# Patient Record
Sex: MALE | Race: BLACK OR AFRICAN AMERICAN | NOT HISPANIC OR LATINO | Employment: PART TIME | URBAN - METROPOLITAN AREA
[De-identification: names, ages, dates, MRNs, and addresses within clinical notes are randomized per-mention and may not be internally consistent; named-entity substitution may affect disease eponyms.]

---

## 2018-06-04 ENCOUNTER — HOSPITAL ENCOUNTER (EMERGENCY)
Facility: HOSPITAL | Age: 53
Discharge: HOME | End: 2018-06-04
Attending: EMERGENCY MEDICINE
Payer: COMMERCIAL

## 2018-06-04 VITALS
DIASTOLIC BLOOD PRESSURE: 89 MMHG | HEART RATE: 52 BPM | OXYGEN SATURATION: 99 % | WEIGHT: 215 LBS | HEIGHT: 67 IN | SYSTOLIC BLOOD PRESSURE: 138 MMHG | BODY MASS INDEX: 33.74 KG/M2 | TEMPERATURE: 98 F | RESPIRATION RATE: 18 BRPM

## 2018-06-04 DIAGNOSIS — R07.89 MUSCULOSKELETAL CHEST PAIN: ICD-10-CM

## 2018-06-04 DIAGNOSIS — M25.512 ACUTE PAIN OF LEFT SHOULDER: Primary | ICD-10-CM

## 2018-06-04 DIAGNOSIS — R07.9 CHEST PAIN: ICD-10-CM

## 2018-06-04 LAB
ALBUMIN SERPL BCP-MCNC: 4.1 G/DL
ALP SERPL-CCNC: 57 U/L
ALT SERPL W/O P-5'-P-CCNC: 24 U/L
ANION GAP SERPL CALC-SCNC: 6 MMOL/L
AST SERPL-CCNC: 19 U/L
BASOPHILS # BLD AUTO: 0.01 K/UL
BASOPHILS NFR BLD: 0.2 %
BILIRUB SERPL-MCNC: 0.4 MG/DL
BNP SERPL-MCNC: 32 PG/ML
BUN SERPL-MCNC: 19 MG/DL
CALCIUM SERPL-MCNC: 10 MG/DL
CHLORIDE SERPL-SCNC: 107 MMOL/L
CO2 SERPL-SCNC: 28 MMOL/L
CREAT SERPL-MCNC: 1 MG/DL
DIFFERENTIAL METHOD: NORMAL
EOSINOPHIL # BLD AUTO: 0.1 K/UL
EOSINOPHIL NFR BLD: 1.7 %
ERYTHROCYTE [DISTWIDTH] IN BLOOD BY AUTOMATED COUNT: 13.5 %
EST. GFR  (AFRICAN AMERICAN): >60 ML/MIN/1.73 M^2
EST. GFR  (NON AFRICAN AMERICAN): >60 ML/MIN/1.73 M^2
GLUCOSE SERPL-MCNC: 89 MG/DL
HCT VFR BLD AUTO: 43.9 %
HGB BLD-MCNC: 15.2 G/DL
LYMPHOCYTES # BLD AUTO: 1.9 K/UL
LYMPHOCYTES NFR BLD: 29.5 %
MCH RBC QN AUTO: 30.5 PG
MCHC RBC AUTO-ENTMCNC: 34.6 G/DL
MCV RBC AUTO: 88 FL
MONOCYTES # BLD AUTO: 0.6 K/UL
MONOCYTES NFR BLD: 9.6 %
NEUTROPHILS # BLD AUTO: 3.8 K/UL
NEUTROPHILS NFR BLD: 59 %
PLATELET # BLD AUTO: 182 K/UL
PMV BLD AUTO: 12 FL
POTASSIUM SERPL-SCNC: 4.3 MMOL/L
PROT SERPL-MCNC: 7.7 G/DL
RBC # BLD AUTO: 4.99 M/UL
SODIUM SERPL-SCNC: 141 MMOL/L
TROPONIN I SERPL DL<=0.01 NG/ML-MCNC: 0.01 NG/ML
WBC # BLD AUTO: 6.38 K/UL

## 2018-06-04 PROCEDURE — 93005 ELECTROCARDIOGRAM TRACING: CPT

## 2018-06-04 PROCEDURE — 63600175 PHARM REV CODE 636 W HCPCS: Performed by: EMERGENCY MEDICINE

## 2018-06-04 PROCEDURE — 83880 ASSAY OF NATRIURETIC PEPTIDE: CPT

## 2018-06-04 PROCEDURE — 25000003 PHARM REV CODE 250: Performed by: EMERGENCY MEDICINE

## 2018-06-04 PROCEDURE — 99284 EMERGENCY DEPT VISIT MOD MDM: CPT | Mod: 25

## 2018-06-04 PROCEDURE — 93010 ELECTROCARDIOGRAM REPORT: CPT | Mod: ,,, | Performed by: INTERNAL MEDICINE

## 2018-06-04 PROCEDURE — 84484 ASSAY OF TROPONIN QUANT: CPT

## 2018-06-04 PROCEDURE — 96374 THER/PROPH/DIAG INJ IV PUSH: CPT

## 2018-06-04 PROCEDURE — 85025 COMPLETE CBC W/AUTO DIFF WBC: CPT

## 2018-06-04 PROCEDURE — 80053 COMPREHEN METABOLIC PANEL: CPT

## 2018-06-04 RX ORDER — DIAZEPAM 5 MG/1
5 TABLET ORAL EVERY 12 HOURS PRN
Qty: 15 TABLET | Refills: 0 | Status: SHIPPED | OUTPATIENT
Start: 2018-06-04 | End: 2018-07-04

## 2018-06-04 RX ORDER — ALBUTEROL SULFATE 90 UG/1
1-2 AEROSOL, METERED RESPIRATORY (INHALATION) EVERY 6 HOURS PRN
Qty: 1 INHALER | Refills: 0 | Status: SHIPPED | OUTPATIENT
Start: 2018-06-04 | End: 2019-06-04

## 2018-06-04 RX ORDER — ASPIRIN 325 MG
325 TABLET ORAL
Status: DISCONTINUED | OUTPATIENT
Start: 2018-06-04 | End: 2018-06-04

## 2018-06-04 RX ORDER — IBUPROFEN 600 MG/1
600 TABLET ORAL EVERY 8 HOURS PRN
Qty: 15 TABLET | Refills: 0 | Status: SHIPPED | OUTPATIENT
Start: 2018-06-04

## 2018-06-04 RX ORDER — DIAZEPAM 2 MG/1
2 TABLET ORAL
Status: COMPLETED | OUTPATIENT
Start: 2018-06-04 | End: 2018-06-04

## 2018-06-04 RX ORDER — KETOROLAC TROMETHAMINE 30 MG/ML
15 INJECTION, SOLUTION INTRAMUSCULAR; INTRAVENOUS
Status: COMPLETED | OUTPATIENT
Start: 2018-06-04 | End: 2018-06-04

## 2018-06-04 RX ADMIN — KETOROLAC TROMETHAMINE 15 MG: 30 INJECTION, SOLUTION INTRAMUSCULAR at 10:06

## 2018-06-04 RX ADMIN — DIAZEPAM 2 MG: 2 TABLET ORAL at 10:06

## 2018-06-04 NOTE — ED PROVIDER NOTES
"Encounter Date: 6/4/2018  52-year-old male smoker with chest pain radiating to the neck x2 days.  He is alert, in mild discomfort, vital signs with no significant abnormality.  Orders placed.  No STEMI.  Simone RODRÍGUEZ, 9:19 a.m.  SCRIBE #1 NOTE: I, Matthew Lloyd, am scribing for, and in the presence of,  Margarita Fajardo MD. I have scribed the following portions of the note - Other sections scribed: HPI and ROS.       History   No chief complaint on file.    Chief Complaint: Chest Pain    HPI: This 52 y.o. Male with asthma presents to the ED c/o left lateral chest pain. Symptoms began gradually 2 days ago. Symptoms began after awakening. Symptoms were mild initially  but progressed throughout the day. Symptoms are worse while at rest and with left arm movement. Pain is constant, severe, and described as a pulling sensation. He states "it feels like something is balled up". Pain radiates into the left side neck. No relief with OTC gas relief medication. He denies fever, chills, SOB, cough, nausea, vomiting, or abdominal pain.       The history is provided by the patient. No  was used.     Review of patient's allergies indicates:  No Known Allergies  No past medical history on file.  Past Surgical History:   Procedure Laterality Date    BACK SURGERY       No family history on file.  Social History   Substance Use Topics    Smoking status: Current Every Day Smoker    Smokeless tobacco: Not on file    Alcohol use Yes     Review of Systems   Constitutional: Negative for chills and fever.   HENT: Negative for ear pain and sore throat.    Eyes: Negative for pain.   Respiratory: Negative for cough and shortness of breath.    Cardiovascular: Positive for chest pain.   Gastrointestinal: Negative for abdominal pain, diarrhea, nausea and vomiting.   Genitourinary: Negative for dysuria.   Musculoskeletal: Negative for myalgias (arm or leg pain).   Skin: Negative for rash.   Neurological: Negative for " headaches.       Physical Exam     Initial Vitals   BP Pulse Resp Temp SpO2   -- -- -- -- --      MAP       --         Physical Exam    ED Course   Procedures  Labs Reviewed   CBC W/ AUTO DIFFERENTIAL   COMPREHENSIVE METABOLIC PANEL   TROPONIN I   B-TYPE NATRIURETIC PEPTIDE     EKG Readings: (Independently Interpreted)   Initial Reading: No STEMI. Rhythm: Normal Sinus Rhythm. Ectopy: No Ectopy. Conduction: Normal.     Imaging Results          X-Ray Shoulder 2 or More Views Left (Final result)  Result time 06/04/18 13:24:26    Final result by Sunday Sandra MD (06/04/18 13:24:26)                 Impression:      As above.      Electronically signed by: Sunday Sandra MD  Date:    06/04/2018  Time:    13:24             Narrative:    EXAMINATION:  XR SHOULDER COMPLETE 2 OR MORE VIEWS LEFT    CLINICAL HISTORY:  chest wall and shoulder pain;    TECHNIQUE:  Two or three views of the left shoulder were performed.    COMPARISON:  None    FINDINGS:  Bones: No fracture.  No lytic or blastic lesion.    Joints: No evidence for glenohumeral dislocation.  Acromioclavicular joint is unremarkable.    Soft tissues: Unremarkable.                               X-Ray Chest PA And Lateral (Final result)  Result time 06/04/18 11:06:00    Final result by Sunday Sandra MD (06/04/18 11:06:00)                 Impression:      No acute cardiopulmonary process.      Electronically signed by: Sunday Sandra MD  Date:    06/04/2018  Time:    11:06             Narrative:    EXAMINATION:  XR CHEST PA AND LATERAL    CLINICAL HISTORY:  Chest Pain;    TECHNIQUE:  PA and lateral views of the chest were performed.    COMPARISON:  01/18/2014    FINDINGS:  There is no consolidation, effusion, or pneumothorax.  Cardiomediastinal silhouette is unremarkable.  Regional osseous structures are unremarkable.                                   Medical Decision Making:   Clinical Tests:   Lab Tests: Ordered and Reviewed  ED Management:  Mr Robb feels much  better.  Is able to range his arm without much difficulty at this point.  States his symptoms have almost completely resolved.  His pain is entirely reproducible with ranging the left shoulder or pressing on left anterior shoulder left upper chest. Given sling for comfort and he feels much better. Most of sxs seem to stem from L anterior shoulder and we discussed f/u w pcp and/or ortho. We disucssed home care and worrisome signs that should prompt need to return to er should they occur. There is no indication for further emergent intervention or evaluation at this time.               Scribe Attestation:   Scribe #1: I performed the above scribed service and the documentation accurately describes the services I performed. I attest to the accuracy of the note.    Attending Attestation:           Physician Attestation for Scribe:  Physician Attestation Statement for Scribe #1: I, Margarita Fajardo MD, reviewed documentation, as scribed by Matthew Lloyd in my presence, and it is both accurate and complete.                    Clinical Impression:   The encounter diagnosis was Chest pain.    X-Ray Chest PA And Lateral    (Results Pending)                              Brett Fajardo MD  06/10/18 [id number]

## 2018-06-04 NOTE — ED TRIAGE NOTES
Patient presents to ED alert and oriented, via private vehicle.  Patient reports left sided chest pain and pressure that radiates to neck, that started Saturday. Patient denies N/V or SOB.

## 2018-08-15 DIAGNOSIS — M54.2 CERVICALGIA: ICD-10-CM

## 2018-08-15 DIAGNOSIS — M25.511 RIGHT SHOULDER PAIN: Primary | ICD-10-CM

## 2018-08-16 ENCOUNTER — HOSPITAL ENCOUNTER (OUTPATIENT)
Dept: RADIOLOGY | Facility: HOSPITAL | Age: 53
Discharge: HOME | End: 2018-08-16
Attending: INTERNAL MEDICINE
Payer: COMMERCIAL

## 2018-08-16 DIAGNOSIS — M25.511 RIGHT SHOULDER PAIN: ICD-10-CM

## 2018-08-16 DIAGNOSIS — M54.2 CERVICALGIA: ICD-10-CM

## 2018-08-16 PROCEDURE — 73030 X-RAY EXAM OF SHOULDER: CPT | Mod: TC,FY,RT

## 2018-08-16 PROCEDURE — 73030 X-RAY EXAM OF SHOULDER: CPT | Mod: 26,RT,, | Performed by: RADIOLOGY

## 2018-08-16 PROCEDURE — 72040 X-RAY EXAM NECK SPINE 2-3 VW: CPT | Mod: TC,FY

## 2018-08-16 PROCEDURE — 72040 X-RAY EXAM NECK SPINE 2-3 VW: CPT | Mod: 26,,, | Performed by: RADIOLOGY

## 2021-10-18 DIAGNOSIS — N13.8 BPH WITH OBSTRUCTION/LOWER URINARY TRACT SYMPTOMS: ICD-10-CM

## 2021-10-18 DIAGNOSIS — N40.1 BPH WITH OBSTRUCTION/LOWER URINARY TRACT SYMPTOMS: ICD-10-CM

## 2021-10-18 DIAGNOSIS — M65.811 OTHER SYNOVITIS AND TENOSYNOVITIS, RIGHT SHOULDER: Primary | ICD-10-CM

## 2021-11-01 ENCOUNTER — LAB VISIT (OUTPATIENT)
Dept: LAB | Facility: HOSPITAL | Age: 56
End: 2021-11-01
Attending: INTERNAL MEDICINE
Payer: MEDICAID

## 2021-11-01 DIAGNOSIS — M65.811 OTHER SYNOVITIS AND TENOSYNOVITIS, RIGHT SHOULDER: ICD-10-CM

## 2021-11-01 DIAGNOSIS — N40.1 BPH WITH OBSTRUCTION/LOWER URINARY TRACT SYMPTOMS: ICD-10-CM

## 2021-11-01 DIAGNOSIS — N13.8 BPH WITH OBSTRUCTION/LOWER URINARY TRACT SYMPTOMS: ICD-10-CM

## 2021-11-01 LAB
ALBUMIN SERPL BCP-MCNC: 3.9 G/DL (ref 3.5–5.2)
ALP SERPL-CCNC: 58 U/L (ref 55–135)
ALT SERPL W/O P-5'-P-CCNC: 24 U/L (ref 10–44)
ANION GAP SERPL CALC-SCNC: 7 MMOL/L (ref 8–16)
AST SERPL-CCNC: 25 U/L (ref 10–40)
BILIRUB SERPL-MCNC: 0.5 MG/DL (ref 0.1–1)
BUN SERPL-MCNC: 14 MG/DL (ref 6–20)
CALCIUM SERPL-MCNC: 9.6 MG/DL (ref 8.7–10.5)
CHLORIDE SERPL-SCNC: 107 MMOL/L (ref 95–110)
CO2 SERPL-SCNC: 25 MMOL/L (ref 23–29)
CREAT SERPL-MCNC: 0.9 MG/DL (ref 0.5–1.4)
EST. GFR  (AFRICAN AMERICAN): >60 ML/MIN/1.73 M^2
EST. GFR  (NON AFRICAN AMERICAN): >60 ML/MIN/1.73 M^2
GLUCOSE SERPL-MCNC: 93 MG/DL (ref 70–110)
POTASSIUM SERPL-SCNC: 4.4 MMOL/L (ref 3.5–5.1)
PROT SERPL-MCNC: 7.3 G/DL (ref 6–8.4)
SODIUM SERPL-SCNC: 139 MMOL/L (ref 136–145)

## 2021-11-01 PROCEDURE — 80053 COMPREHEN METABOLIC PANEL: CPT | Performed by: INTERNAL MEDICINE

## 2021-11-01 PROCEDURE — 36415 COLL VENOUS BLD VENIPUNCTURE: CPT | Performed by: INTERNAL MEDICINE

## 2022-09-22 ENCOUNTER — HOSPITAL ENCOUNTER (EMERGENCY)
Facility: HOSPITAL | Age: 57
Discharge: HOME | End: 2022-09-22
Attending: EMERGENCY MEDICINE
Payer: MEDICAID

## 2022-09-22 VITALS
SYSTOLIC BLOOD PRESSURE: 141 MMHG | HEIGHT: 65 IN | BODY MASS INDEX: 34.99 KG/M2 | WEIGHT: 210 LBS | RESPIRATION RATE: 20 BRPM | DIASTOLIC BLOOD PRESSURE: 85 MMHG | TEMPERATURE: 98 F | HEART RATE: 65 BPM | OXYGEN SATURATION: 98 %

## 2022-09-22 DIAGNOSIS — M54.6 ACUTE BILATERAL THORACIC BACK PAIN: ICD-10-CM

## 2022-09-22 DIAGNOSIS — R07.89 CHEST PAIN, NON-CARDIAC: Primary | ICD-10-CM

## 2022-09-22 DIAGNOSIS — S29.019A ACUTE THORACIC MYOFASCIAL STRAIN, INITIAL ENCOUNTER: ICD-10-CM

## 2022-09-22 DIAGNOSIS — R07.9 CHEST PAIN: ICD-10-CM

## 2022-09-22 LAB
ALBUMIN SERPL BCP-MCNC: 3.9 G/DL (ref 3.5–5.2)
ALP SERPL-CCNC: 62 U/L (ref 55–135)
ALT SERPL W/O P-5'-P-CCNC: 24 U/L (ref 10–44)
ANION GAP SERPL CALC-SCNC: 9 MMOL/L (ref 8–16)
AST SERPL-CCNC: 30 U/L (ref 10–40)
BASOPHILS # BLD AUTO: 0.01 K/UL (ref 0–0.2)
BASOPHILS NFR BLD: 0.3 % (ref 0–1.9)
BILIRUB SERPL-MCNC: 0.4 MG/DL (ref 0.1–1)
BUN SERPL-MCNC: 13 MG/DL (ref 6–20)
CALCIUM SERPL-MCNC: 9.3 MG/DL (ref 8.7–10.5)
CHLORIDE SERPL-SCNC: 104 MMOL/L (ref 95–110)
CO2 SERPL-SCNC: 25 MMOL/L (ref 23–29)
CREAT SERPL-MCNC: 0.9 MG/DL (ref 0.5–1.4)
DIFFERENTIAL METHOD: ABNORMAL
EOSINOPHIL # BLD AUTO: 0 K/UL (ref 0–0.5)
EOSINOPHIL NFR BLD: 0 % (ref 0–8)
ERYTHROCYTE [DISTWIDTH] IN BLOOD BY AUTOMATED COUNT: 13.2 % (ref 11.5–14.5)
EST. GFR  (NO RACE VARIABLE): >60 ML/MIN/1.73 M^2
GLUCOSE SERPL-MCNC: 88 MG/DL (ref 70–110)
HCT VFR BLD AUTO: 41.6 % (ref 40–54)
HGB BLD-MCNC: 14.7 G/DL (ref 14–18)
IMM GRANULOCYTES # BLD AUTO: 0.01 K/UL (ref 0–0.04)
IMM GRANULOCYTES NFR BLD AUTO: 0.3 % (ref 0–0.5)
INR PPP: 1.1 (ref 0.8–1.2)
LYMPHOCYTES # BLD AUTO: 0.6 K/UL (ref 1–4.8)
LYMPHOCYTES NFR BLD: 16.4 % (ref 18–48)
MCH RBC QN AUTO: 30.3 PG (ref 27–31)
MCHC RBC AUTO-ENTMCNC: 35.3 G/DL (ref 32–36)
MCV RBC AUTO: 86 FL (ref 82–98)
MONOCYTES # BLD AUTO: 1 K/UL (ref 0.3–1)
MONOCYTES NFR BLD: 29.5 % (ref 4–15)
NEUTROPHILS # BLD AUTO: 1.9 K/UL (ref 1.8–7.7)
NEUTROPHILS NFR BLD: 53.5 % (ref 38–73)
NRBC BLD-RTO: 0 /100 WBC
PLATELET # BLD AUTO: 169 K/UL (ref 150–450)
PMV BLD AUTO: 11.4 FL (ref 9.2–12.9)
POTASSIUM SERPL-SCNC: 3.7 MMOL/L (ref 3.5–5.1)
PROT SERPL-MCNC: 7.4 G/DL (ref 6–8.4)
PROTHROMBIN TIME: 11.8 SEC (ref 9–12.5)
RBC # BLD AUTO: 4.85 M/UL (ref 4.6–6.2)
SODIUM SERPL-SCNC: 138 MMOL/L (ref 136–145)
TROPONIN I SERPL DL<=0.01 NG/ML-MCNC: <0.006 NG/ML (ref 0–0.03)
WBC # BLD AUTO: 3.53 K/UL (ref 3.9–12.7)

## 2022-09-22 PROCEDURE — 25000003 PHARM REV CODE 250: Performed by: EMERGENCY MEDICINE

## 2022-09-22 PROCEDURE — 80053 COMPREHEN METABOLIC PANEL: CPT | Performed by: EMERGENCY MEDICINE

## 2022-09-22 PROCEDURE — 93005 ELECTROCARDIOGRAM TRACING: CPT

## 2022-09-22 PROCEDURE — 99285 EMERGENCY DEPT VISIT HI MDM: CPT | Mod: 25

## 2022-09-22 PROCEDURE — 93010 EKG 12-LEAD: ICD-10-PCS | Mod: ,,, | Performed by: INTERNAL MEDICINE

## 2022-09-22 PROCEDURE — 84484 ASSAY OF TROPONIN QUANT: CPT | Performed by: EMERGENCY MEDICINE

## 2022-09-22 PROCEDURE — 85610 PROTHROMBIN TIME: CPT | Performed by: EMERGENCY MEDICINE

## 2022-09-22 PROCEDURE — 63600175 PHARM REV CODE 636 W HCPCS: Performed by: EMERGENCY MEDICINE

## 2022-09-22 PROCEDURE — 93010 ELECTROCARDIOGRAM REPORT: CPT | Mod: ,,, | Performed by: INTERNAL MEDICINE

## 2022-09-22 PROCEDURE — 96375 TX/PRO/DX INJ NEW DRUG ADDON: CPT

## 2022-09-22 PROCEDURE — 85025 COMPLETE CBC W/AUTO DIFF WBC: CPT | Performed by: EMERGENCY MEDICINE

## 2022-09-22 PROCEDURE — 96374 THER/PROPH/DIAG INJ IV PUSH: CPT

## 2022-09-22 RX ORDER — FAMOTIDINE 40 MG/1
40 TABLET, FILM COATED ORAL DAILY
COMMUNITY
Start: 2022-05-01

## 2022-09-22 RX ORDER — DEXAMETHASONE SODIUM PHOSPHATE 4 MG/ML
8 INJECTION, SOLUTION INTRA-ARTICULAR; INTRALESIONAL; INTRAMUSCULAR; INTRAVENOUS; SOFT TISSUE
Status: COMPLETED | OUTPATIENT
Start: 2022-09-22 | End: 2022-09-22

## 2022-09-22 RX ORDER — FINASTERIDE 5 MG/1
5 TABLET, FILM COATED ORAL DAILY
COMMUNITY
Start: 2022-08-30

## 2022-09-22 RX ORDER — PANTOPRAZOLE SODIUM 40 MG/1
80 TABLET, DELAYED RELEASE ORAL
Status: COMPLETED | OUTPATIENT
Start: 2022-09-22 | End: 2022-09-22

## 2022-09-22 RX ORDER — MAG HYDROX/ALUMINUM HYD/SIMETH 200-200-20
60 SUSPENSION, ORAL (FINAL DOSE FORM) ORAL
Status: COMPLETED | OUTPATIENT
Start: 2022-09-22 | End: 2022-09-22

## 2022-09-22 RX ORDER — PROMETHAZINE HYDROCHLORIDE AND DEXTROMETHORPHAN HYDROBROMIDE 6.25; 15 MG/5ML; MG/5ML
5 SYRUP ORAL EVERY 6 HOURS PRN
Qty: 180 ML | Refills: 0 | Status: SHIPPED | OUTPATIENT
Start: 2022-09-22 | End: 2022-10-02

## 2022-09-22 RX ORDER — KETOROLAC TROMETHAMINE 30 MG/ML
30 INJECTION, SOLUTION INTRAMUSCULAR; INTRAVENOUS
Status: COMPLETED | OUTPATIENT
Start: 2022-09-22 | End: 2022-09-22

## 2022-09-22 RX ORDER — GABAPENTIN 300 MG/1
CAPSULE ORAL
COMMUNITY

## 2022-09-22 RX ORDER — PREDNISONE 20 MG/1
40 TABLET ORAL DAILY
Qty: 10 TABLET | Refills: 0 | Status: SHIPPED | OUTPATIENT
Start: 2022-09-22 | End: 2022-09-27

## 2022-09-22 RX ADMIN — PANTOPRAZOLE SODIUM 80 MG: 40 TABLET, DELAYED RELEASE ORAL at 08:09

## 2022-09-22 RX ADMIN — KETOROLAC TROMETHAMINE 30 MG: 30 INJECTION, SOLUTION INTRAMUSCULAR; INTRAVENOUS at 08:09

## 2022-09-22 RX ADMIN — ALUMINUM HYDROXIDE, MAGNESIUM HYDROXIDE, AND DIMETHICONE 60 ML: 200; 20; 200 SUSPENSION ORAL at 08:09

## 2022-09-22 RX ADMIN — DEXAMETHASONE SODIUM PHOSPHATE 8 MG: 4 INJECTION INTRA-ARTICULAR; INTRALESIONAL; INTRAMUSCULAR; INTRAVENOUS; SOFT TISSUE at 09:09

## 2022-09-22 NOTE — ED PROVIDER NOTES
"Encounter Date: 9/22/2022    SCRIBE #1 NOTE: I, Radha Dickson, am scribing for, and in the presence of,  Ron Villarreal MD. I have scribed the following portions of the note - Other sections scribed: HPI, ROS.     History     Chief Complaint   Patient presents with    Chest Pain     9/10 left sided chest pain since yesterday. Recent cough.      A 56 y.o. male with a pertinent PMHx of asthma and GERD, presents to the ED for evaluation of constant 9/10 "sticking and pulling" mid-sternal chest pain that began last night. Patient reports that he was coughing excessively at work when his symptoms suddenly began. He has associated symptoms of back pain, cough, chills, diaphoresis, hemoptysis, fever, post-tussive emesis, and a mild posterior headache that radiates to the front of his head. His pain is exacerbated with movement and twisting. He is a smoker. No other exacerbating or alleviating factors. Patient denies shortness of breath, abdominal pain, nausea, diarrhea, dysuria, congestion, sore throat, arm or leg trouble, eye pain, ear pain, rash, or other associated symptoms.    The history is provided by the patient. No  was used.   Review of patient's allergies indicates:   Allergen Reactions    Tomato Itching    Tamsulosin Other (See Comments)     Past Medical History:   Diagnosis Date    Asthma      Past Surgical History:   Procedure Laterality Date    BACK SURGERY      CYST REMOVAL      LEG SURGERY      wrist       No family history on file.  Social History     Tobacco Use    Smoking status: Every Day     Packs/day: 0.50     Types: Cigarettes   Substance Use Topics    Alcohol use: Yes    Drug use: Yes     Types: Marijuana     Review of Systems   Constitutional:  Positive for chills, diaphoresis and fever.   HENT:  Negative for congestion, ear pain and sore throat.    Eyes:  Negative for pain.   Respiratory:  Positive for cough. Negative for shortness of breath.         (+) Hemoptysis " "  Cardiovascular:  Positive for chest pain (mid-sternal, 9/10, "sticking and pulling").   Gastrointestinal:  Positive for vomiting (post-tussive). Negative for abdominal pain, diarrhea and nausea.   Genitourinary:  Negative for dysuria.   Musculoskeletal:  Positive for back pain.        (-) Arm or leg trouble.    Skin:  Negative for rash.   Neurological:  Positive for headaches (mild posterior, radiates to the front of the head).   Psychiatric/Behavioral:  Negative for confusion.      Physical Exam     Initial Vitals [09/22/22 0721]   BP Pulse Resp Temp SpO2   (!) 141/97 88 20 98.4 °F (36.9 °C) 100 %      MAP       --         Physical Exam  The patient was examined specifically for the following:   General:No significant distress, Good color, Warm and dry. Head and neck:Scalp atraumatic, Neck supple. Neurological:Appropriate conversation, Gross motor deficits. Eyes:Conjugate gaze, Clear corneas. ENT: No epistaxis. Cardiac: Regular rate and rhythm, Grossly normal heart tones. Pulmonary: Wheezing, Rales. Gastrointestinal: Abdominal tenderness, Abdominal distention. Musculoskeletal: Extremity deformity, Apparent pain with range of motion of the joints. Skin: Rash.   The findings on examination were normal except for the following:  The lungs are clear and free of wheezing rales rubs or rhonchi.  Heart tones are normal.  The patient has regular rate and rhythm.  The abdomen is nontender there is no guarding rebound mass or distention.  There is diffuse midline thoracic back tenderness as well as diffuse sternal tenderness that is marked.  The patient winces with coughing deep breathing and movement.  There is no evidence of respiratory distress.  Oxygen saturations are 100%.  There is no swelling or tenderness in the lower extremities.  ED Course   Procedures  Labs Reviewed   CBC W/ AUTO DIFFERENTIAL - Abnormal; Notable for the following components:       Result Value    WBC 3.53 (*)     Lymph # 0.6 (*)     Lymph % 16.4 " (*)     Mono % 29.5 (*)     All other components within normal limits   COMPREHENSIVE METABOLIC PANEL   TROPONIN I   PROTIME-INR     EKG Readings: (Independently Interpreted)   This patient is in a normal sinus rhythm with a sinus arrhythmia with a heart rate of 85.  There is poor R-wave progression across the precordium.  There are nonspecific T-wave changes.  ST segments are normal.  There is no definite evidence of acute myocardial infarction or malignant arrhythmia.     Imaging Results              X-Ray Chest AP Portable (Final result)  Result time 09/22/22 08:41:51      Final result by Abdirahman Regan MD (09/22/22 08:41:51)                   Impression:      No convincing evidence of acute cardiopulmonary disease.      Electronically signed by: Abdirahman Regan  Date:    09/22/2022  Time:    08:41               Narrative:    EXAMINATION:  XR CHEST AP PORTABLE    CLINICAL HISTORY:  chest pain;    TECHNIQUE:  Single frontal view of the chest was performed.    COMPARISON:  Chest radiograph 06/04/2018    FINDINGS:  Monitoring leads over the chest.  Cardiomediastinal contour appears within normal limits.  Lungs appear essentially clear.  No definite pneumothorax or large volume pleural effusion.  No acute findings in the visualized abdomen.  Osteitis and soft tissue structures appear without definite acute abnormality.                                       Medications   dexamethasone injection 8 mg (has no administration in time range)   aluminum-magnesium hydroxide-simethicone 200-200-20 mg/5 mL suspension 60 mL (60 mLs Oral Given 9/22/22 0827)   pantoprazole EC tablet 80 mg (80 mg Oral Given 9/22/22 0827)   ketorolac injection 30 mg (30 mg Intravenous Given 9/22/22 0827)     Medical Decision Making:   History:   Old Medical Records: I decided to obtain old medical records.     Given the above, this patient presents to the emergency room complaining of a cough that started yesterday.  After the onset of cough the  patient developed some thoracic back pain that is bilateral midthoracic back sharper worse with deep breathing movement and coughing.  He also developed some precordial chest pain also sharp worse with deep breathing and moving and coughing.  The patient is tender in the thoracic back.  He is tender in the chest.  Lungs are clear.  Chest x-ray is negative.  The patient is not short of breath.  He does not have uncontrolled hypertension.  Dissection of a thoracic aortic aneurysm was carefully considered as well as myocardial infarction pneumothorax pneumonia pleural effusion pulmonary embolus.  All of these seem quite unlikely.  The patient has a very low Wells score.  This is not cardiac chest pain syndrome with the exacerbation by deep breathing and cough.  The patient has brisk pulses in both wrists.  I will discharge to outpatient evaluation and treatment.  I will try treating with steroids.  I believe this is likely a viral URI.       Scribe Attestation:   Scribe #1: I performed the above scribed service and the documentation accurately describes the services I performed. I attest to the accuracy of the note.                   Clinical Impression:   Final diagnoses:  [R07.9] Chest pain  [R07.89] Chest pain, non-cardiac (Primary)  [M54.6] Acute bilateral thoracic back pain  [S29.019A] Acute thoracic myofascial strain, initial encounter        ED Disposition Condition    Discharge Stable        I personally performed the services described in this documentation.  All medical record  entries made by the scribe are at my direction and in my presence.  Signed, Dr. Villarreal   ED Prescriptions       Medication Sig Dispense Start Date End Date Auth. Provider    predniSONE (DELTASONE) 20 MG tablet Take 2 tablets (40 mg total) by mouth once daily. for 5 days 10 tablet 9/22/2022 9/27/2022 Ron Villarreal MD    promethazine-dextromethorphan (PROMETHAZINE-DM) 6.25-15 mg/5 mL Syrp Take 5 mLs by mouth every 6 (six) hours as  needed (cough). 180 mL 9/22/2022 10/2/2022 Ron Villarreal MD          Follow-up Information       Follow up With Specialties Details Why Contact Info    Calvin Antunez MD Internal Medicine, Pediatrics In 3 days  [address]  [facility] [address]  [phone]               Ron Villarreal MD  09/22/22 0917

## 2022-09-22 NOTE — DISCHARGE INSTRUCTIONS
Please return immediately if you get worse or if new problems develop.  Please follow-up with your primary care doctor this week.  Rest.  Prednisone as directed.  Phenergan DM for cough.  Tylenol 1000 mg by mouth every 8 hours as needed for pain.  Please follow-up with your primary care doctor in 3 days

## 2022-09-22 NOTE — ED TRIAGE NOTES
Pt arrived to the ED due to SOB and midsternal chest pain that radiates to the middle of his back that worsens with palpation. 1 n/v episode yesterday. 1 episode of hemoptysis also reported.

## 2023-10-17 ENCOUNTER — HOSPITAL ENCOUNTER (OUTPATIENT)
Dept: RADIOLOGY | Facility: HOSPITAL | Age: 58
Discharge: HOME | End: 2023-10-17
Attending: INTERNAL MEDICINE
Payer: MEDICAID

## 2023-10-17 DIAGNOSIS — J18.9 PNEUMONIA, UNSPECIFIED ORGANISM: Primary | ICD-10-CM

## 2023-10-17 DIAGNOSIS — J18.9 PNEUMONIA, UNSPECIFIED ORGANISM: ICD-10-CM

## 2023-10-17 PROCEDURE — 71046 X-RAY EXAM CHEST 2 VIEWS: CPT | Mod: TC,FY

## 2023-10-17 PROCEDURE — 71046 XR CHEST PA AND LATERAL: ICD-10-PCS | Mod: 26,,, | Performed by: RADIOLOGY

## 2023-10-17 PROCEDURE — 71046 X-RAY EXAM CHEST 2 VIEWS: CPT | Mod: 26,,, | Performed by: RADIOLOGY

## 2024-01-09 DIAGNOSIS — M25.561 PAIN IN RIGHT KNEE: Primary | ICD-10-CM

## 2024-01-16 ENCOUNTER — HOSPITAL ENCOUNTER (OUTPATIENT)
Dept: RADIOLOGY | Facility: HOSPITAL | Age: 59
Discharge: HOME | End: 2024-01-16
Attending: INTERNAL MEDICINE
Payer: MEDICAID

## 2024-01-16 DIAGNOSIS — M25.561 PAIN IN RIGHT KNEE: ICD-10-CM

## 2024-01-16 PROCEDURE — 73562 X-RAY EXAM OF KNEE 3: CPT | Mod: 26,RT,, | Performed by: RADIOLOGY

## 2024-01-16 PROCEDURE — 73562 X-RAY EXAM OF KNEE 3: CPT | Mod: TC,FY,RT

## 2024-02-26 ENCOUNTER — CLINICAL SUPPORT (OUTPATIENT)
Dept: REHABILITATION | Facility: HOSPITAL | Age: 59
End: 2024-02-26
Payer: MEDICAID

## 2024-02-26 DIAGNOSIS — R26.89 GAIT, ANTALGIC: ICD-10-CM

## 2024-02-26 DIAGNOSIS — M25.561 CHRONIC PAIN OF RIGHT KNEE: Primary | ICD-10-CM

## 2024-02-26 DIAGNOSIS — M25.661 DECREASED RANGE OF MOTION OF RIGHT KNEE: ICD-10-CM

## 2024-02-26 DIAGNOSIS — G89.29 CHRONIC PAIN OF RIGHT KNEE: Primary | ICD-10-CM

## 2024-02-26 DIAGNOSIS — M22.2X1 PATELLOFEMORAL PAIN SYNDROME OF RIGHT KNEE: ICD-10-CM

## 2024-02-26 DIAGNOSIS — R29.898 WEAKNESS OF RIGHT LOWER EXTREMITY: ICD-10-CM

## 2024-02-26 PROCEDURE — 97110 THERAPEUTIC EXERCISES: CPT | Mod: PN

## 2024-02-26 PROCEDURE — 97161 PT EVAL LOW COMPLEX 20 MIN: CPT | Mod: PN

## 2024-02-26 NOTE — PROGRESS NOTES
[facility]  Physical Therapy Initial Evaluation  Date: 2/26/2024   Name: Cesar Robb  [facility] Number: [id number]    Therapy Diagnosis:   Encounter Diagnoses   Name Primary?    Chronic pain of right knee Yes    Patellofemoral pain syndrome of right knee     Gait, antalgic     Weakness of right lower extremity     Decreased range of motion of right knee      Physician: Calvin Antunez*    Physician Orders: PT Eval and Treat   Medical Diagnosis from Referral: Sprain of lateral collateral ligament of right knee, subsequent encounter   Evaluation Date: 2/26/2024  Authorization Period Expiration: 12-31-24  Plan of Care Expiration: 5-26-24  Visit # / Visits authorized: 1/ 20 Progress Note Due: 5-26-24  FOTO: 1/ 1    Precautions: Standard and asthma     Time In: 10:00 am  Time Out: 10:45 am  Total Appointment Time (timed & untimed codes): 45 minutes    Subjective   Date of onset: over several years   History of current condition - Cesar reports: that he has pain R knee. He states pain increases when he sit down, stand. Walking. Ascending and descending stairs. Pt states he feels sharp     VALERIE: No traumatic event. Gradually increase   Any falls:  No   Geovanna: sometimes he feels R knee gives out   Any ankle bruise: No   Pain radiates: No   Pain constant or intermittent: Intermittent   Any injection: No       Current 5/10, worst 8/10, best 5/10   Location: right knee   Description: Sharp  Aggravating Factors: Standing, Walking, Flexing, and Lifting and standing activities   Easing Factors: nothing    Prior Therapy: no  Social History:  lives with their family  Occupation: Work in [employer] shop  Prior Level of Function: Independent   Current Level of Function: Independent     Pts goals: Decrease knee pain       Imaging, X-rays:  FINDINGS:  Mild DJD.  Ununited ossicles adjacent to the tibial tuberosity identified.  Faint calcifications noted in the menisci.  No fracture or  dislocation.  No bone destruction identified.       Medical History:   Past Medical History:   Diagnosis Date    Asthma        Surgical History:   Cesar Robb  has a past surgical history that includes Back surgery; Leg Surgery; wrist; and Cyst Removal.    Medications:   Cesar has a current medication list which includes the following prescription(s): albuterol, diazepam, famotidine, finasteride, gabapentin, ibuprofen, and ibuprofen.    Allergies:   Review of patient's allergies indicates:   Allergen Reactions    Tomato Itching    Tamsulosin Other (See Comments)            Objective     Observation: :    Posture Alignment: knee valgus     Sensation: intact to light touch    DTR: intact to light touch    GAIT DEVIATIONS: [unfilled] displays antalgic gait;    Range of Motion:   Knee Left active   Flexion 120 deg    Extension 0      Knee Right active   Flexion 105 deg lateral knee pain   Extension 0 deg lateral knee pain        Lower Extremity Strength   Right LE  Left LE    Knee extension: 4-/5 Knee extension: 4+/5   Knee flexion: 4-/5 Knee flexion: 4+/5   Hip flexion: 4-/5 Hip flexion: 4+/5   Hip extension:  4-/5 Hip extension: 4+/5   Hip abduction: 4-/5 Hip abduction: 4+/5   Hip adduction: 4-/5 Hip adduction 4+/5   Ankle dorsiflexion: 4+/5 Ankle dorsiflexion: 4+/5   Ankle plantarflexion: 4+/5 Ankle plantarflexion: 4+/5       Step down test: Positive  Squat: Positive  Single leg balance: Positive    Joint Mobility:      Patellar sup./inf: decrease    Patellar med/lat: decrease     Palpation: Several pain at lateral R patella        Flexibility:    Ely's test: R = NP ; L = NP   Popliteal Angle: R = positive ; L = negative              TREATMENT   Treatment Time In: 9:22 am   Treatment Time Out: 9:45 am  Total Treatment time separate from Evaluation: 23 minutes    Cesar received therapeutic exercises to develop strength, endurance, ROM, and flexibility for 23 minutes including:    Quad sets   SAQ  SLR  HL  hip and   HL hip add         Home Exercises and Patient Education Provided    Education provided:   - Perform HEP 2 times per day     Written Home Exercises Provided: Patient instructed to cont prior HEP.  Exercises were reviewed and Cesar was able to demonstrate them prior to the end of the session.  Cesar demonstrated good  understanding of the education provided.     See EMR under Patient Instructions for exercises provided 2/26/2024.    Assessment   Cesar is a 58 y.o. male referred to outpatient Physical Therapy with a medical diagnosis of Sprain of lateral collateral ligament of right knee, subsequent encounter . Pt presents to therapy with R knee pain. Pt has sign and symptoms of R patellofemoral pain. X-rays reveals Mild DJD. Pt ambulated with antalgic gait pattern. Pt has decrease R LE muscles strength and knee AROM. Pt start to have R hip pain due to compensation pattern during gait. Pt has decrease R hamstring flexibility. During palpation,  Several pain at lateral R patella. Pt will benefit from skilled PT services to decrease pain to return to work pain free.     Pt prognosis is Good.   Pt will benefit from skilled outpatient Physical Therapy to address the deficits stated above and in the chart below, provide pt/family education, and to maximize pt's level of independence.     Plan of care discussed with patient: Yes  Pt's spiritual, cultural and educational needs considered and patient is agreeable to the plan of care and goals as stated below:     Anticipated Barriers for therapy: Scheduling issues     Medical Necessity is demonstrated by the following  History  Co-morbidities and personal factors that may impact the plan of care Co-morbidities:   Asthma     Personal Factors:   no deficits     Complexity: low   Examination  Body Structures and Functions, activity limitations and participation restrictions that may impact the plan of care Body Regions:   ankle    Body Systems:     ROM  strength  balance  gait  transfers  transitions  motor control  motor learning    Participation Restrictions:   Community ambulation     Activity limitations:   Learning and applying knowledge  no deficits    General Tasks and Commands  no deficits    Communication  no deficits    Mobility  walking  moving around using equipment (WC)  using transportation (bus, train, plane, car)  driving (bike, car, motorcycle)    Self care  no deficits    Domestic Life  shopping  cooking  doing house work (cleaning house, washing dishes, laundry)    Interactions/Relationships  no deficits    Life Areas  no deficits    Community and Social Life  community life         Complexity: low  See FOTO outcome assessment   Clinical Presentation stable and uncomplicated low   Decision Making/ Complexity Score: low   GOALS: Short Term Goals:  4 weeks  1.Report decreased in pain at worse less than  <   / =  4  /10  to increase tolerance for functional mobility.On going  2. Pt to improve R knee range of motion by 25% to allow for improved functional mobility to allow for improvement in IADLs. .On going  3. Increased R LE MMT 1/2 grade to increase tolerance for ADL and work activities.On going  4. Pt to tolerate HEP to improve ROM and independence with ADL's.On going    Long Term Goals: 8 weeks  1.Report decreased in pain at worse less than  <   / =  3  /10  to increase tolerance for functional mobility. On going  2.Pt to improve R knee range of motion by 75% to allow for improved functional mobility to allow for improvement in IADLs. On going  3.Increased R LE  MMT 1 grade to increase tolerance for ADL and work activities.On going  4. Pt will report 70% or greater  on FOTO knee survey  to demonstrate increase in LE function with every day tasks. On going  5. Pt to be Independent with HEP to improve ROM and independence with ADL's. On going      Plan   Plan of care Certification: 2/26/2024 to 5-26-24.    Outpatient Physical Therapy 2  times weekly for 12 weeks to include the following interventions: Gait Training, Manual Therapy, Moist Heat/ Ice, Neuromuscular Re-ed, Patient Education, Self Care, Therapeutic Activities, and Therapeutic Exercise. Dry needling.     Flo Angel, PT      I CERTIFY THE NEED FOR THESE SERVICES FURNISHED UNDER THIS PLAN OF TREATMENT AND WHILE UNDER MY CARE   Physician's comments:     Physician's Signature: ___________________________________________________

## 2024-03-04 ENCOUNTER — CLINICAL SUPPORT (OUTPATIENT)
Dept: REHABILITATION | Facility: HOSPITAL | Age: 59
End: 2024-03-04
Payer: MEDICAID

## 2024-03-04 DIAGNOSIS — M25.561 CHRONIC PAIN OF RIGHT KNEE: Primary | ICD-10-CM

## 2024-03-04 DIAGNOSIS — R29.898 WEAKNESS OF RIGHT LOWER EXTREMITY: ICD-10-CM

## 2024-03-04 DIAGNOSIS — R26.89 GAIT, ANTALGIC: ICD-10-CM

## 2024-03-04 DIAGNOSIS — G89.29 CHRONIC PAIN OF RIGHT KNEE: Primary | ICD-10-CM

## 2024-03-04 DIAGNOSIS — M22.2X1 PATELLOFEMORAL PAIN SYNDROME OF RIGHT KNEE: ICD-10-CM

## 2024-03-04 DIAGNOSIS — M25.661 DECREASED RANGE OF MOTION OF RIGHT KNEE: ICD-10-CM

## 2024-03-04 PROCEDURE — 97110 THERAPEUTIC EXERCISES: CPT | Mod: PN,CQ

## 2024-03-04 NOTE — PROGRESS NOTES
"[facility]   Physical Therapy Treatment Note     Name: Cesar Robb  Clinic Number: [id number]    Therapy Diagnosis:   Encounter Diagnoses   Name Primary?    Chronic pain of right knee Yes    Patellofemoral pain syndrome of right knee     Gait, antalgic     Weakness of right lower extremity     Decreased range of motion of right knee      Physician: Calvin Antunez*    Visit Date: 3/4/2024    Physician Orders: PT Eval and Treat   Medical Diagnosis from Referral: Sprain of lateral collateral ligament of right knee, subsequent encounter   Evaluation Date: 2/26/2024  Authorization Period Expiration: 12-31-24  Plan of Care Expiration: 5-26-24  Visit # / Visits authorized: 1/ 20 Progress Note Due: 5-26-24  FOTO: 1/ 1     Precautions: Standard and asthma     Visit #: 1 of 20  PTA Visit #: 1  Time In: 0810am (pt arrives late)  Time Out: 0855am  Total Billable Time: 45 minutes     Subjective     Pt reports: he stands and walk all day and has knee pain when he turns certain way.   He was compliant with home exercise program.  Response to previous treatment: sore  Functional change: ongoing knee pain     Pain: 3/10  Location: right knee      Objective     Cesar received therapeutic exercises to develop strength, endurance, ROM, and flexibility for 45 minutes including:    Quad sets with towel rolled under the knee, 2x10   SAQ with large bolster, 2x10   SLR on RLE, x 10 (right hip pain here)   HL hip add with ball squeezes, 2x10 5" holds   HL hip abd with GTB, 2x10     LAQ on RLE only, 2x10  Standing TKE with red sport cord on RLE only, 2x10   Shuttle double legs press with (1 black, 1 red band), 2x10  Recumbent bike for 5 minutes Lv 1  Patient education         Home Exercises Provided and Patient Education Provided     Written Home Exercises Provided: yes.  Exercises were reviewed and Cesar was able to demonstrate them prior to the end of the session.  Cesar demonstrated good "  understanding of the education provided.     Education provided: y  - home exercise program   - advised patient to use step stool at work for decrease stress pressure to right knee     Assessment   First follow up since evaluation for Sprain of lateral collateral ligament of right knee, subsequent encounter. 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 Increase right hip pain in SLR exercise, therefore, will modified this exercise as appropriate. Advised patient to use step stool at work for decrease stress pressure to right knee. Otherwise, patient appears to tolerate most exercises well expect for SLR. Will progress as tolerated.     Cesar Is progressing well towards his goals.   Pt prognosis is Good.     Pt will continue to benefit from skilled outpatient physical therapy to address the deficits listed in the problem list box on initial evaluation, provide pt/family education and to maximize pt's level of independence in the home and community environment.     Pt's spiritual, cultural and educational needs considered and pt agreeable to plan of care and goals.     Anticipated barriers to physical therapy: Scheduling issues      Goals:   Short Term Goals:  4 weeks  1.Report decreased in pain at worse less than  <   / =  4  /10  to increase tolerance for functional mobility.On going  2. Pt to improve R knee range of motion by 25% to allow for improved functional mobility to allow for improvement in IADLs. .On going  3. Increased R LE MMT 1/2 grade to increase tolerance for ADL and work activities.On going  4. Pt to tolerate HEP to improve ROM and independence with ADL's.On going     Long Term Goals: 8 weeks  1.Report decreased in pain at worse less than  <   / =  3  /10  to increase tolerance for functional mobility. On going  2.Pt to improve R knee range of motion by 75% to allow for improved functional mobility to allow for improvement in IADLs. On going  3.Increased R LE  MMT 1 grade to  increase tolerance for ADL and work activities.On going  4. Pt will report 70% or greater  on FOTO knee survey  to demonstrate increase in LE function with every day tasks. On going  Plan   Plan of care Certification: 2/26/2024 to 5-26-24.     Outpatient Physical Therapy 2 times weekly for 12 weeks to include the following interventions: Gait Training, Manual Therapy, Moist Heat/ Ice, Neuromuscular Re-ed, Patient Education, Self Care, Therapeutic Activities, and Therapeutic Exercise. Dry needling.     Misty Obrien, PTA   3/4/2024

## 2024-03-11 ENCOUNTER — CLINICAL SUPPORT (OUTPATIENT)
Dept: REHABILITATION | Facility: HOSPITAL | Age: 59
End: 2024-03-11
Payer: MEDICAID

## 2024-03-11 DIAGNOSIS — M25.561 CHRONIC PAIN OF RIGHT KNEE: ICD-10-CM

## 2024-03-11 DIAGNOSIS — G89.29 CHRONIC PAIN OF RIGHT KNEE: ICD-10-CM

## 2024-03-11 DIAGNOSIS — S83.421D SPRAIN OF LATERAL COLLATERAL LIGAMENT OF RIGHT KNEE, SUBSEQUENT ENCOUNTER: Primary | ICD-10-CM

## 2024-03-11 DIAGNOSIS — M25.661 DECREASED RANGE OF MOTION OF RIGHT KNEE: ICD-10-CM

## 2024-03-11 DIAGNOSIS — R26.89 GAIT, ANTALGIC: ICD-10-CM

## 2024-03-11 DIAGNOSIS — M22.2X1 PATELLOFEMORAL PAIN SYNDROME OF RIGHT KNEE: ICD-10-CM

## 2024-03-11 DIAGNOSIS — R29.898 WEAKNESS OF RIGHT LOWER EXTREMITY: ICD-10-CM

## 2024-03-11 PROCEDURE — 97110 THERAPEUTIC EXERCISES: CPT | Mod: PN,CQ

## 2024-03-11 NOTE — PROGRESS NOTES
"[facility]   Physical Therapy Treatment Note     Name: Cesar Robb  Clinic Number: [id number]    Therapy Diagnosis:   Encounter Diagnoses   Name Primary?    Chronic pain of right knee     Patellofemoral pain syndrome of right knee     Gait, antalgic     Weakness of right lower extremity     Decreased range of motion of right knee     Sprain of lateral collateral ligament of right knee, subsequent encounter Yes     Physician: Calvin Antunez*    Visit Date: 3/11/2024    Physician Orders: PT Eval and Treat   Medical Diagnosis from Referral: Sprain of lateral collateral ligament of right knee, subsequent encounter   Evaluation Date: 2/26/2024  Authorization Period Expiration: 12-31-24  Plan of Care Expiration: 5-26-24  Visit # / Visits authorized: 2/ 20 Progress Note Due: 5-26-24  FOTO: 1/ 1     Precautions: Standard and asthma     Visit #: 2 of 20  PTA Visit #: 2  Time In: 0900am  Time Out: 0950am  Total Billable Time: 50 minutes     Subjective     Pt reports: he felt sore from previous session and his knee felt like it's about to give out.   He was compliant with home exercise program.  Response to previous treatment: sore  Functional change: ongoing knee pain     Pain: 3/10  Location: right knee      Objective     Cesar received therapeutic exercises to develop strength, endurance, ROM, and flexibility for 50 minutes including:    Recumbent bike for 10 minutes Lv 3    Quad sets with towel rolled under the knee, 2x10   SAQ with large bolster, 2x10   HL hip add with ball squeezes, 2x10 5" holds   HL hip abd with GTB, 2x10   +Bridges, 2x10  +Prone right knee quads stretch with strap, 3x30" holds  +Long sitting calf stretch with strap, 3x30" holds  +Hamstrings stretch on step, 3x30" holds    LAQ on RLE only, 2x10  Standing TKE with red sport cord on RLE only, 2x10   Shuttle double legs press with (1 black, 1 red band), 2x10  +Step ups on 6", 2x10  +Lateral side steps with " RTB at knees, 1 lap          Home Exercises Provided and Patient Education Provided     Written Home Exercises Provided: yes.  Exercises were reviewed and Cesar was able to demonstrate them prior to the end of the session.  Cesar demonstrated good  understanding of the education provided.     Education provided: y  - home exercise program   - advised patient to use step stool at work for decrease stress pressure to right knee     Assessment   Introduced more quads and glute medius strengthening this visit. Patient appears to be tolerating well with all exercises today without increase pain. Defer SLR exercise this visit due to increase pain to right hip. Otherwise, Cesar Is progressing well towards his goals. Provided cues and demonstration for all exercises for proper techniques. Will increase reps to 30 next visit.     Pt prognosis is Good.     Pt will continue to benefit from skilled outpatient physical therapy to address the deficits listed in the problem list box on initial evaluation, provide pt/family education and to maximize pt's level of independence in the home and community environment.     Pt's spiritual, cultural and educational needs considered and pt agreeable to plan of care and goals.     Anticipated barriers to physical therapy: Scheduling issues      Goals:   Short Term Goals:  4 weeks  1.Report decreased in pain at worse less than  <   / =  4  /10  to increase tolerance for functional mobility.On going  2. Pt to improve R knee range of motion by 25% to allow for improved functional mobility to allow for improvement in IADLs. .On going  3. Increased R LE MMT 1/2 grade to increase tolerance for ADL and work activities.On going  4. Pt to tolerate HEP to improve ROM and independence with ADL's.On going     Long Term Goals: 8 weeks  1.Report decreased in pain at worse less than  <   / =  3  /10  to increase tolerance for functional mobility. On going  2.Pt to improve R knee range of motion  by 75% to allow for improved functional mobility to allow for improvement in IADLs. On going  3.Increased R LE  MMT 1 grade to increase tolerance for ADL and work activities.On going  4. Pt will report 70% or greater  on FOTO knee survey  to demonstrate increase in LE function with every day tasks. On going  Plan   Plan of care Certification: 2/26/2024 to 5-26-24.     Outpatient Physical Therapy 2 times weekly for 12 weeks to include the following interventions: Gait Training, Manual Therapy, Moist Heat/ Ice, Neuromuscular Re-ed, Patient Education, Self Care, Therapeutic Activities, and Therapeutic Exercise. Dry needling.     Misty Obrien, PTA   3/11/2024